# Patient Record
Sex: MALE | Race: WHITE
[De-identification: names, ages, dates, MRNs, and addresses within clinical notes are randomized per-mention and may not be internally consistent; named-entity substitution may affect disease eponyms.]

---

## 2019-03-25 ENCOUNTER — HOSPITAL ENCOUNTER (INPATIENT)
Dept: HOSPITAL 80 - FED | Age: 24
LOS: 3 days | Discharge: HOME | DRG: 880 | End: 2019-03-28
Attending: PSYCHIATRY & NEUROLOGY | Admitting: PSYCHIATRY & NEUROLOGY
Payer: MEDICAID

## 2019-03-25 DIAGNOSIS — F41.0: ICD-10-CM

## 2019-03-25 DIAGNOSIS — F12.259: ICD-10-CM

## 2019-03-25 DIAGNOSIS — Z59.0: ICD-10-CM

## 2019-03-25 DIAGNOSIS — G89.29: ICD-10-CM

## 2019-03-25 DIAGNOSIS — F41.1: Primary | ICD-10-CM

## 2019-03-25 DIAGNOSIS — F12.280: ICD-10-CM

## 2019-03-25 LAB — PLATELET # BLD: 262 10^3/UL (ref 150–400)

## 2019-03-25 PROCEDURE — G0480 DRUG TEST DEF 1-7 CLASSES: HCPCS

## 2019-03-25 SDOH — ECONOMIC STABILITY - HOUSING INSECURITY: HOMELESSNESS: Z59.0
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
## 2019-03-25 NOTE — ASMTTCLDSP
TLC Discharge Disposition

 

Disposition:                  Answers:  Admit                                 

Discharge                     

Concerns/Recommendations:     

Notes:

In consultation with [facility] ED physician, Maynor Floyd MD and on-call psychiatrist, Alverto Lau MD, both concurred that pt appears to meet 27-65 criteria requiring psychiatric 

hospitalization as pt appears to be at risk of harm to self/others due to a mental illness 

condition. Pt was read the Patient Rights and Responsibilities Statement on (3/25/2019 at 

22:00), original placed on chart, and was given photocopy of Rights. Pt declined to sign the 

Patient Rights. Pt was given the 3N prohibited belongings list while in the ED.

For inpatient                 Alverto Lau MD

admission, the following      

psychiatrist agreed to        

accept patient for            

admission to Behavioral       

Health ([facility]):              

 

Date Signed:  03/25/2019 10:26 PM

Electronically Signed By:Kristine Cowan
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
## 2019-03-26 NOTE — PDMN
Medical Necessity


Medical necessity: Pt meets IP criteria per MD & MCG B-002-IP; est los >2 mn 

for eval/tx of generalized anxiety/panic disorder; pt on M1 hold due to being a 

danger to self & others; admit for further monitoring, crisis stabilization, 

safety & med management; per H&P & order 3/25/19
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
## 2019-03-27 NOTE — SOAPPROG
SOAP Progress Note


Assessment/Plan: 


Assessment:





Generalized Anxiety Disorder, Panic Disorder, Cannabis Use Disorder, Severe.  

Cannabis-induced anxiety disorder.  No improvement noted (see subjective/

objective note).  Patient could benefit from continued inpatient 

hospitalization for crisis stabilization, safety, and medication evaluation.  











Plan:





1. Psychotropic medications:  Patient agrees to increase Klonipin to 0.75 mg po 

BID and begin trial of Klonipin 0.5 mg po QD PRN.  No other medication changes 

at this time as more time is needed to determine ongoing tolerability and 

efficacy.  Plan is to continue to observe patient for response and side effects 

from medications, and ongoing monitoring and evaluation.  


2. Review with patient informed consent and recommendations for psychotropic 

medication treatment listed below


3. Labs: no additional labs at this time


4. Therapy: continue milieu and group therapy  


5. Further investigation including gathering information from patients 

relatives and review of past case records to inform treatment plan.


6. Safety/Wellness plan and follow-up outpatient appointments to be established 

prior to discharge.  Next steps are for patient to meet with care manager to 

plan a safe discharge plan and establish outpatient services for ongoing 

treatment.  


7. Confer with inpatient treatment team regarding treatment plan.  


8. Psychosocial stressors addressed through 


9. Legal status: M1


10. Consider discharge next week if patient is in stable condition, safe, and 

has a safe discharge plan.   


11. Substance abuse interventions: THC





PSYCHOTROPIC MEDICATION TREATMENT INFORMED CONSENT and RECOMMENDATIONS: Review 

nature of condition, diagnosis, and prognosis.  Review nature and purpose of 

psychotropic medication treatment.  Review type of psychotropic medications 

being ordered.  Review risk and benefits of psychotropic medication treatment.  

Review probable length of time patient will need to take medications.  Review 

risk and benefits of not undergoing psychotropic medication treatment.  Review 

alternative treatments to psychotropic medications.  Review psychotropic 

medications contraindications, drug-drug interactions, side effects, and 

importance of reporting any side effects to a psychiatric provider or nurse 

during inpatient hospitalization, and upon discharge to patients psychiatric 

outpatient provider, primary care provider, or other health care professional.  

Review importance of asking a nurse, psychiatric provider, or primary care 

provider any questions or problems concerning the psychotropic medications.  

Verify patient understands the information that has been provided, and 

understands, accepts, and agrees to psychotropic medications.  





Review patients safety plan and importance of patient to report to staff while 

hospitalized if patient is ever a danger to self/others, or unable to care for 

self, and upon discharge, the importance for patient to contact [place] [facility] or 911, or go to the nearest emergency room, if patient is ever a 

danger to self/others, or unable to care for self.  Recommend that upon 

discharge patient establish medication management treatment with a psychiatric 

provider, establishes routine therapy appointments, and follow-up with primary 

care provider.  Verify patient understands and agrees to these recommendations.











03/27/19 06:42





Subjective: 


Following up with patient for evaluation of anxiety, panic, and safety.  

Patient reports, "I am still super anxious.  Yesterday afternoon was really bad.

"  Patient denies SI/HI, and reports the statements he made prior to admission 

were only to get his mothers attention and he has no intention on harming 

himself or others.  Patient reports no side effects from current medications, 

and agrees to increase Klonipin to 0.75 mg po BID and begin Klonipin 0.5 mg po 

QD PRN.  Patient requests family meeting with his mother today.    





Objective: 





 Vital Signs











Temp Pulse Resp BP Pulse Ox


 


 36.6 C   82   14   127/66 H  93 


 


 03/25/19 23:30  03/25/19 23:30  03/25/19 23:30  03/25/19 23:30  03/25/19 23:30








NURSING REPORT: Consulted with nursing for update on patients progress in 

treatment.  Nurses report patient is engaged in treatment, is attending some 

groups, slept 8 hours, expresses the following psychiatric symptoms: severe 

anxiety, exhibits the following psychiatric symptoms: anxious, patient had a 

panic attack yesterday morning prior to dose of Klonipin; is eating all meals; 

is agreeable to schedule medications and taking as prescribed with no report of 

side effects, with no s/s of EPS/akathisia, and denies SI/HI, denies A/V 

hallucinations, and denies delusions.








FAMILY UPDATE: This NP spoke to patients mother last night from at 1930 for 15 

minutes to provide update on patients treatment.  At patients request, patients 

mother was invited for family meeting with this NP and patient.  Patients 

mother stated she plans to contact [facility] today to arrange outpatient services.  

She stated patient has a long history of cannabis use, and history of anxiety 

with panic attacks to the point of becoming violent during panic.  Patients 

mother is supportive of patients treatment, responded well to call with this NP

, and thanked this NP for update.  Patient signed SHELBI yesterday (SHELBI in chart), 

and requested this NP provide his mother updates.  Patient requested family 

meeting with his mother and this NP.    








MSE: The patient is a well-nourished male looking stated chronological age.  

Attire is appropriate and dress is hospital garb.  Grooming status is 

appropriate.  Ambulation is independent.  Gait is normal and coordinated.  

Posture is normal and relaxed.  Eye contact is appropriate.  Motor activity is 

appropriate with purposeful, organized, coordinated movements; with no 

involuntary movements.  Attitude is cooperative.  Patient appears attentive and 

relates well to this interviewer.  Language production is spontaneous.  R/R/V 

normal.  Articulation is clear.  Patient reports mood as okay with congruent 

and appropriate affect.  Patients thought process is linear and logical.  

Patient does not report suicidal ideation or homicidal ideation.  Patient 

denies auditory, visual hallucinations.  Patient denies delusions.  Patient 

does not appear to be attending to internal stimuli.  Patients attention and 

concentration are fair.  Patient is oriented to person, place.  Patients 

insight and judgment are fair.  








SUBSTANCE ABUSE BRIEF INTERVENTION: Brief intervention regarding the risks of 

THC abuse is provided to patient with goal to reduce the risk of harm that 

could result from the continued use of THC, with the general aim to investigate 

the problem, raise awareness of problem, develop a solution with the patient, 

recommend a specific change or activity, and motivate the patient toward 

change.  Assess substance abuse behavior and give supportive advice about harm 

reduction, recommend a reduction in hazardous/at-risk consumption patterns, and 

facilitate referrals for additional specialized treatment with care 

coordinator.  Intermediate goal is for the patient to quit and attend 

outpatient substance abuse treatment.  Intervention focus on intermediate goals 

to allow for more immediate success in the treatment process to keep the 

patient motivated.  Review following with patient: Cannabis use risks: Short-

term use: impaired short-term memory, impaired motor coordination, altered 

judgement, in high doses paranoia and psychosis.  Long-term use addiction, 

diminished life satisfaction and achievement, symptoms of chronic bronchitis, 

and increased risk of chronic psychosis disorders if predisposition to such 

disorders.  In withdrawal anger, aggression irritability, anxiety and 

nervousness, decreased appetite or weight loss, restlessness, and sleep 

difficulties with strange dreams.  








OUTPATIENT SUBSTANCE ABUSE TREATMENT: Patient referred to outpatient provider 

and treatment for continued treatment related to substance abuse.











- Time Spent With Patient


Time Spent With Patient: 


15 minutes, met with patient individually.








- Pending Discharge


Pending Discharge Within 24 Hours: No


Pending Discharge Within 48 Hours: No





ICD10 Worksheet


Patient Problems: 


 Problems











Problem Status Onset


 


Cannabis use disorder, severe, dependence Acute  


 


Cannabis-induced anxiety disorder Acute  


 


Homicidal ideations Acute  


 


Marijuana use Acute  


 


PTSD (post-traumatic stress disorder) Acute  


 


Suicidal ideations Acute  


 


Generalized anxiety disorder Chronic  


 


Panic disorder Chronic  


 


Altered mental status, unspecified Acute  


 


Gastritis Acute  


 


Nausea & vomiting Acute  


 


Psychotic disorder Acute

## 2019-03-27 NOTE — ASMTCMCOM
CM Note

 

CM Note                       

Notes:

CC meets with client, provider and MOC (via phone) for family meeting,etc.  Call go well until 

client starts to yell at MOC through phone.  reiterated that we are all here to help 

client, etc.   Client states  "I don't want to be here and I want to be homeless."  Client is 

projected to discharge tomorrow with f/u with [facility].* TBD.*

 

Date Signed:  03/27/2019 01:51 PM

Electronically Signed By:Reynaldo Gordillo

## 2019-03-28 VITALS — DIASTOLIC BLOOD PRESSURE: 78 MMHG | SYSTOLIC BLOOD PRESSURE: 125 MMHG

## 2019-03-28 NOTE — BDS
[f 
rep st]



                                                  BEHAVIORAL HEALTH DISCHARGE 
SUMMARY





PSYCHIATRIC DISCHARGE SUMMARY



REASON FOR ADMISSION:  From the ED note dated 03/25/2019, the patient presented 
to the Emergency Department on an M1 hold with the [facility].  The 
patient was making suicidal threats and homicidal threats during a phone call 
with his mother.  The patient was admitted involuntarily and on an M1 hold due 
to being a danger to himself and others.  The patient was also gravely disabled 
due to his underlying mental illness.  The patient was admitted for safety, 
crisis stabilization, and medication management.



ADMITTING DIAGNOSES:  

1.  Generalized anxiety disorder.  

2.  Panic disorder.

3.  Cannabis use disorder, severe dependence. 

4.  Cannabis-induced anxiety disorder.



ADMISSION PHYSICAL EXAM:  The patient was seen on 03/26/2019, for a history and 
physical consultation for medical clearance for inpatient psychiatric 
hospitalization and treatment.  The patient was medically cleared for inpatient 
psychiatric hospitalization and treatment.  For further details, please refer 
to consultation documentation dated 03/26/2019.



ADMISSION LABS:  

1.  CBC within normal limits.

2.  BMP within normal limits, except glucose is elevated at 111.  

3.  Hemoglobin A1c within normal limits at 5.3.

4.  Liver function within normal limits.

5.  Lipid panel within normal limits, except triglycerides were elevated at 169
, cholesterol low at 101, LDL cholesterol calculated was low at 18, VLDL 
cholesterol was elevated at 34, non-HDL cholesterol was low at 52, LDL/HDL 
ratio was low at 0.37.

6.  Toxicology screen non-negative for THC, negative for the other substances 
screened and negative for ethyl alcohol.



MAJOR PROCEDURES OR TESTS:  None.



HOSPITAL COURSE:  The most prominent symptoms and behaviors while the patient 
was here were reports and exhibits of severe anxiety.  The patient also 
reported panic attacks and did exhibit a panic attack upon admission.  
Treatment modalities utilized were milieu and group therapy.  Klonopin 0.5 mg 
p.o. twice daily was started to target anxiety and panic symptoms.  It was 
tolerated with no report of side effects.  Klonopin was titrated to 1 mg p.o. 
twice daily throughout the course of the patient's hospitalization.  It was 
tolerated with no report of side effects and with good response.  Patient has 
improved considerably with no signs of psychiatric symptoms and no psychiatric 
symptoms expressed.  Patient requests to discharge today, and states he has 
improved since admission, states to be in stable condition, feels safe to 
discharge, and he contracts for safety.  Patients response to treatment was 
good.  There were no adverse or unexpected results of treatment.  The patient 
was safe throughout stay, active in treatment, engaged in groups, and was 
appropriate with staff.  Patient met with treatment team prior to discharge to 
assess readiness to discharge and review discharge plan.  The treatment team 
consensus is the patient in stable condition, has a safe discharge plan, and is 
ready to discharge today.  



CONDITION AT DISCHARGE:  Patient is in stable condition and is no longer a 
danger to self or others, and is not gravely disabled due to mental illness.  
Patient is no longer in need of inpatient level of care, and can be safely and 
effectively treated within the community.  The patients level of risk at time 
of discharge is low.  MSE: The patient is casually dressed and with good hygiene
, and looks stated age.  Patient is sitting, posture is upright, and position 
is relaxed.  Patient appears awake, alert, and responds appropriately and 
reasonably during interview.  Patient is engaged, relates well to interviewer, 
and emotional facial expression is appropriate to situation and changes 
appropriately with topic.  Patient is cooperative, makes comfortable eye contact
, and movements are voluntary, deliberate, coordinated, and smooth and even 
with no inappropriate movements.  Patient makes laryngeal sounds effortlessly 
and shares conversation appropriately; pace of conversation is appropriate, and 
stream of talking is fluent; articulation is clear and understandable; word 
choice is effortless and appropriate for education level; completes sentences, 
occasionally pausing to think; rate and volume are appropriate for interview 
and setting.  Patient reports mood as euthymic.  Patients affect is stable with 
full variable range, congruent with mood, and appropriate to speech and 
circumstances.  Patient has linear and logical thinking, with no loose 
associations, tangential thought, thought blocking, concrete thinking, or any 
other signs of formal thought disorder.  Patient denies suicidal and homicidal 
ideation, and denies hallucinations and delusions.  Patient appears to be a 
reliable historian with sound judgement and good insight into current 
condition.  Patient has no apparent dysfunction in recent or remote memory noted
, and no evidence of gross cognitive dysfunction noted at any point during the 
interview.



DISCHARGE DIAGNOSES: 

1.  Generalized anxiety disorder.  

2.  Panic disorder.

3.  Cannabis use disorder, severe dependence. 

4.  Cannabis-induced anxiety disorder.



CURRENT MEDICATIONS:  After reviewing options, risks and benefits with the 
patient, the patient agrees to continue:  Klonopin 1 mg p.o. twice daily.  The 
patient requests a prescription for this medication at time of discharge.  A 
prescription for 30 days is provided.  Prescription is reviewed with the 
patient at time of discharge to ensure accuracy and patient understanding.



DISPOSITION:  The patient left hospital independently and voluntarily and plans 
to stay at the homeless shelter in [place], [place] and follow-up at [facility] for 
ongoing psychiatric treatment.



FOLLOWUP:  Care coordinator reports the appropriate outpatient follow-up 
services have been established and outpatient appointments have been scheduled.
  The patient received written instructions with times and dates of outpatient 
follow-up appointments.  The following follow-up recommendations were provided 
to the patient at discharge: Continue psychotropic medications as prescribed 
and attend appointments as scheduled.  Report any side effects to a psychiatric 
outpatient provider, a primary care provider, or other health care 
professional.  Address any questions or problems concerning the psychotropic 
medications with a psychiatric outpatient provider, a primary care provider, or 
other health care professional.  Contact [place] [facility] or [facility], or go 
to the nearest emergency room, if you are ever a danger to yourself/others, or 
unable to care for yourself.  As soon as possible, establish a routine 
medication management treatment with a psychiatric provider, establish routine 
therapy appointments, and follow-up with a primary care provider.  



LEGAL COURSE:  The patient was admitted on an M1 hold for involuntary inpatient 
psychiatric hospitalization and treatment.  The patient discharged today 
independently and voluntarily.



ATTITUDE AT TIME OF DISCHARGE:  The patients attitude was positive at time of 
discharge, and patient reports looking forward to discharging today.  The 
patient reports he feels safe to discharge, is no longer a danger to himself or 
others, is in stable condition, and contracts for safety.  Patient states he 
will continue medications as prescribed, and establish medication management 
treatment with an outpatient provider after discharge.  Patient reports he 
understands the information that has been provided to him, and he understands, 
accepts, and agrees to psychotropic medications.  Patient describes internal 
protective factors as the coping skills he has learned while hospitalized here, 
and he plans to continue to practice these coping skills after discharge.  



FAMILY MEETING: This NP and patient, at patient's request, had a family meeting 
with patient's mother by phone prior to patient discharging to review treatment 
plan, discharge, and assess readiness and safety to discharge.  Patient's 
mother agrees with treatment plan and discharge plan, and agrees patient is 
safe to discharge today and has a safe discharge plan.



LABS AND STUDIES:  There were no pending labs or studies at time of discharge.



ADVANCED DIRECTIVES:  There were no advanced directives on file, and the 
patient was full code during this hospitalization.



The following psychotropic medication treatment informed consent and 
recommendations were provided to the patient at time of discharge.  Patient 
reports he understands, accepts, and agrees to the information that has been 
provided.   



PSYCHOTROPIC MEDICATION TREATMENT INFORMED CONSENT and RECOMMENDATIONS: Review 
nature of condition, diagnosis, and prognosis.  Review nature and purpose of 
psychotropic medication treatment. Review type of psychotropic medications 
being prescribed.  Review risk and benefits of psychotropic medication 
treatment.  Review probable length of time will need to take medications.  
Review risk and benefits of not undergoing psychotropic medication treatment.  
Review alternative treatments to psychotropic medications.  Review psychotropic 
medications contraindications, side effects, and importance of reporting any 
side effects to a psychiatric provider, primary care provider, or other health 
care professional.  Review importance of asking a psychiatric provider or 
primary care provider any questions or problems concerning the psychotropic 
medications.  



Review safety plan and the importance to contact [place] [facility] or [facility]
, or go to the nearest emergency room, if ever a danger to yourself/others, or 
unable to care for yourself.  Recommend upon discharge to establish routine 
medication management treatment with a psychiatric provider, establish routine 
therapy appointments, and follow-up with a primary care provider.  Verify 
patient understands, accepts, and agrees to the information that has been 
provided. 







Job #:  [id number]

MTDD

## 2019-03-28 NOTE — ASMTBHDC
Notes

 

Note:                         

Notes:

CC confirms all necessary follow up apts:



Follow up with: 



[facility]

[address], 

[place] [address]

[phone]





Next Medication Apt: Tuesday April 2nd (04/02/19) with Dr. Hoskins, check in 10:15am (at address 

above).*** 



Next Apt: on April 1st (01/01/19) with Driss Juan at 10:45am (at the above address). **Please 

call Driss as needed [phone].**



**Check into [facility] ([employer]), with counselor on duty**between the hours of 8:30am to 

11am** Check in at .**







___________________________________________





[facility]: [facility] 

Bria Juares M.D  (PCP)

[address] [place] [id number] 

PH: [phone] 

FAX: [phone]



Next Apt: Friday April 5th (04/15/19) with Dr. Juares 10:30am. He needs to be there at 10:15 to check 


in.******



___________________________________________



Homless Resources:



[facility]

How to Access Adult Homeless Services in [facility]



Are you a homeless adult (18 or over) with no children in your household?

Do you need a safe place to sleep, shelter, or help with basic needs? Your first step to receiving 

services from [facility] or the [facility] or [place] is to go to the nearest [facility] 

site. This is a required step for anyone seeking homeless-related services.



What happens at [facility]?

You will meet with a staff person and go through a short assessment. Based on that information, you 


will be referred to the most appropriate services given your needs. CE staff are available in both 

[place] and [place]. Services available include shelter; help identifying housing 

options, basic-needs services, and more.



Where do you go for [facility]?



[facility] 

[address] [place] [address]

Mon-Sun 10am-4pm* (Tues 12-4)

[phone]

Bus line: BOUND, 208 



[facility]

[address] [facility] 

Mon-Fri 10am-4pm* (Tues 12-4)

[phone]

Bus lines: 324,323,327, BOLT



*Arrive no later than 3:30 



What if I need help after CE hours?     You may go to the [facility] or [facility] or call 

211 for information on available services, and complete the assessment at the next available time 

to access future services.

 

Date Signed:  03/28/2019 05:08 PM

Electronically Signed By:Reynaldo Gordillo

## 2019-06-22 ENCOUNTER — HOSPITAL ENCOUNTER (INPATIENT)
Dept: HOSPITAL 80 - BBEH | Age: 24
LOS: 3 days | Discharge: HOME | End: 2019-06-25
Payer: MEDICAID